# Patient Record
Sex: MALE | Race: WHITE | NOT HISPANIC OR LATINO
[De-identification: names, ages, dates, MRNs, and addresses within clinical notes are randomized per-mention and may not be internally consistent; named-entity substitution may affect disease eponyms.]

---

## 2021-11-29 ENCOUNTER — NON-APPOINTMENT (OUTPATIENT)
Age: 71
End: 2021-11-29

## 2021-11-29 ENCOUNTER — APPOINTMENT (OUTPATIENT)
Dept: OPHTHALMOLOGY | Facility: CLINIC | Age: 71
End: 2021-11-29
Payer: MEDICARE

## 2021-11-29 PROCEDURE — 92134 CPTRZ OPH DX IMG PST SGM RTA: CPT

## 2021-11-29 PROCEDURE — 99204 OFFICE O/P NEW MOD 45 MIN: CPT

## 2021-11-30 ENCOUNTER — TRANSCRIPTION ENCOUNTER (OUTPATIENT)
Age: 71
End: 2021-11-30

## 2021-11-30 ENCOUNTER — OUTPATIENT (OUTPATIENT)
Dept: OUTPATIENT SERVICES | Facility: HOSPITAL | Age: 71
LOS: 1 days | End: 2021-11-30
Payer: MEDICARE

## 2021-11-30 DIAGNOSIS — Z20.828 CONTACT WITH AND (SUSPECTED) EXPOSURE TO OTHER VIRAL COMMUNICABLE DISEASES: ICD-10-CM

## 2021-11-30 LAB — SARS-COV-2 RNA SPEC QL NAA+PROBE: SIGNIFICANT CHANGE UP

## 2021-11-30 PROCEDURE — 87635 SARS-COV-2 COVID-19 AMP PRB: CPT

## 2021-12-01 ENCOUNTER — OUTPATIENT (OUTPATIENT)
Dept: OUTPATIENT SERVICES | Facility: HOSPITAL | Age: 71
LOS: 1 days | End: 2021-12-01
Payer: MEDICARE

## 2021-12-01 VITALS
RESPIRATION RATE: 16 BRPM | SYSTOLIC BLOOD PRESSURE: 150 MMHG | DIASTOLIC BLOOD PRESSURE: 79 MMHG | HEART RATE: 79 BPM | OXYGEN SATURATION: 100 %

## 2021-12-01 VITALS
WEIGHT: 159.39 LBS | TEMPERATURE: 98 F | RESPIRATION RATE: 18 BRPM | SYSTOLIC BLOOD PRESSURE: 132 MMHG | HEIGHT: 69 IN | DIASTOLIC BLOOD PRESSURE: 71 MMHG | OXYGEN SATURATION: 99 % | HEART RATE: 83 BPM

## 2021-12-01 DIAGNOSIS — H33.022 RETINAL DETACHMENT WITH MULTIPLE BREAKS, LEFT EYE: ICD-10-CM

## 2021-12-01 DIAGNOSIS — Z98.49 CATARACT EXTRACTION STATUS, UNSPECIFIED EYE: Chronic | ICD-10-CM

## 2021-12-01 PROCEDURE — C1889: CPT

## 2021-12-01 PROCEDURE — 67108 REPAIR DETACHED RETINA: CPT | Mod: LT

## 2021-12-01 PROCEDURE — 67108 REPAIR DETACHED RETINA: CPT | Mod: AS

## 2021-12-01 NOTE — ASU DISCHARGE PLAN (ADULT/PEDIATRIC) - PATIENT EDUCATION MATERIALS PROVIED
Suresh gas bubble card, Eye shield instructions and eye kit given to patient/Implant card (specify)/Other (specify)

## 2021-12-01 NOTE — ASU DISCHARGE PLAN (ADULT/PEDIATRIC) - NS MD DC FALL RISK RISK
For information on Fall & Injury Prevention, visit: [email or web address] OR  [email or web address] OR  https://www.cdc.gov/steadi/patient.html

## 2021-12-01 NOTE — ASU PATIENT PROFILE, ADULT - VISION (WITH CORRECTIVE LENSES IF THE PATIENT USUALLY WEARS THEM):
Left eye poor/Partially impaired: cannot see medication labels or newsprint, but can see obstacles in path, and the surrounding layout; can count fingers at arm's length

## 2021-12-01 NOTE — ASU PATIENT PROFILE, ADULT - HEALTHCARE QUESTIONS, PROFILE
Immunization(s) given during visit:    Immunizations Administered     Name Date Dose Route    Influenza, Quadv, adjuvanted, 65 yrs +, IM, PF (Fluad) 9/9/2021 0.5 mL Intramuscular    Site: Deltoid- Right    Lot: [id number]    NDC: 74439-364-96          Most recent Vaccine Information Sheet dated 08/06/21 given to pt will speak with surgeon & anesthesia prior to surgery

## 2021-12-01 NOTE — ASU PATIENT PROFILE, ADULT - FALL HARM RISK - UNIVERSAL INTERVENTIONS
Bed in lowest position, wheels locked, appropriate side rails in place/Call bell, personal items and telephone in reach/Instruct patient to call for assistance before getting out of bed or chair/Non-slip footwear when patient is out of bed/[place] to call system/Physically safe environment - no spills, clutter or unnecessary equipment/Purposeful Proactive Rounding/Room/bathroom lighting operational, light cord in reach

## 2021-12-01 NOTE — ASU DISCHARGE PLAN (ADULT/PEDIATRIC) - CARE PROVIDER_API CALL
Paul Alarcon)  Ophthalmology  [address] [place] [address], [address]  [place] [address]  Phone: [phone]  Fax: [phone]  Scheduled Appointment: 12/02/2021 09:15 AM

## 2022-02-16 ENCOUNTER — NON-APPOINTMENT (OUTPATIENT)
Age: 72
End: 2022-02-16

## 2022-02-16 ENCOUNTER — APPOINTMENT (OUTPATIENT)
Dept: OPHTHALMOLOGY | Facility: CLINIC | Age: 72
End: 2022-02-16
Payer: MEDICARE

## 2022-02-16 PROBLEM — E78.5 HYPERLIPIDEMIA, UNSPECIFIED: Chronic | Status: ACTIVE | Noted: 2021-12-01

## 2022-02-16 PROCEDURE — 92014 COMPRE OPH EXAM EST PT 1/>: CPT

## 2022-03-04 PROBLEM — Z00.00 ENCOUNTER FOR PREVENTIVE HEALTH EXAMINATION: Status: ACTIVE | Noted: 2022-03-04

## 2022-04-18 ENCOUNTER — NON-APPOINTMENT (OUTPATIENT)
Age: 72
End: 2022-04-18

## 2022-04-18 ENCOUNTER — APPOINTMENT (OUTPATIENT)
Dept: OPHTHALMOLOGY | Facility: CLINIC | Age: 72
End: 2022-04-18
Payer: MEDICARE

## 2022-04-18 PROCEDURE — 92014 COMPRE OPH EXAM EST PT 1/>: CPT

## 2022-04-18 PROCEDURE — 92015 DETERMINE REFRACTIVE STATE: CPT

## 2022-07-19 ENCOUNTER — APPOINTMENT (OUTPATIENT)
Dept: OPHTHALMOLOGY | Facility: CLINIC | Age: 72
End: 2022-07-19

## 2022-07-19 ENCOUNTER — NON-APPOINTMENT (OUTPATIENT)
Age: 72
End: 2022-07-19

## 2022-07-19 PROCEDURE — 92014 COMPRE OPH EXAM EST PT 1/>: CPT

## 2023-01-18 ENCOUNTER — APPOINTMENT (OUTPATIENT)
Dept: OPHTHALMOLOGY | Facility: CLINIC | Age: 73
End: 2023-01-18

## 2023-06-20 NOTE — ASU PATIENT PROFILE, ADULT - FALL HARM RISK - FALL HARM RISK
Airway patent, Nasal mucosa clear. Mouth with normal mucosa. Throat has no vesicles, no oropharyngeal exudates and uvula is midline.  Scalp is atraumatic nontender nondeformed.  Neck is supple full range of motion intact nontender. No indicators present

## 2023-10-25 ENCOUNTER — OFFICE (OUTPATIENT)
Dept: URBAN - METROPOLITAN AREA CLINIC 112 | Facility: CLINIC | Age: 73
Setting detail: OPHTHALMOLOGY
End: 2023-10-25
Payer: MEDICARE

## 2023-10-25 DIAGNOSIS — Z96.1: ICD-10-CM

## 2023-10-25 DIAGNOSIS — H25.11: ICD-10-CM

## 2023-10-25 DIAGNOSIS — H33.8: ICD-10-CM

## 2023-10-25 DIAGNOSIS — H40.013: ICD-10-CM

## 2023-10-25 PROBLEM — H16.223 DRY EYE SYNDROME K SICCA; BOTH EYES: Status: ACTIVE | Noted: 2023-10-25

## 2023-10-25 PROCEDURE — 92083 EXTENDED VISUAL FIELD XM: CPT | Performed by: OPHTHALMOLOGY

## 2023-10-25 PROCEDURE — 92004 COMPRE OPH EXAM NEW PT 1/>: CPT | Performed by: OPHTHALMOLOGY

## 2023-10-25 PROCEDURE — 92250 FUNDUS PHOTOGRAPHY W/I&R: CPT | Performed by: OPHTHALMOLOGY

## 2023-10-25 ASSESSMENT — SUPERFICIAL PUNCTATE KERATITIS (SPK)
OD_SPK: 1+
OS_SPK: 1+

## 2023-10-25 ASSESSMENT — REFRACTION_AUTOREFRACTION
OS_SPHERE: -0.75
OS_CYLINDER: -2.00
OD_CYLINDER: -2.75
OS_AXIS: 082
OD_SPHERE: -1.50
OD_AXIS: 098

## 2023-10-25 ASSESSMENT — VISUAL ACUITY
OS_BCVA: 20/25
OD_BCVA: 20/20-

## 2023-10-25 ASSESSMENT — TONOMETRY: OD_IOP_MMHG: 21

## 2023-10-25 ASSESSMENT — CONFRONTATIONAL VISUAL FIELD TEST (CVF)
OD_FINDINGS: FULL
OS_FINDINGS: FULL

## 2023-10-25 ASSESSMENT — SPHEQUIV_DERIVED
OS_SPHEQUIV: -1.75
OD_SPHEQUIV: -2.875

## 2023-12-07 ENCOUNTER — OFFICE (OUTPATIENT)
Dept: URBAN - METROPOLITAN AREA CLINIC 112 | Facility: CLINIC | Age: 73
Setting detail: OPHTHALMOLOGY
End: 2023-12-07
Payer: MEDICARE

## 2023-12-07 DIAGNOSIS — H33.8: ICD-10-CM

## 2023-12-07 DIAGNOSIS — Z96.1: ICD-10-CM

## 2023-12-07 DIAGNOSIS — H40.013: ICD-10-CM

## 2023-12-07 DIAGNOSIS — H25.11: ICD-10-CM

## 2023-12-07 PROCEDURE — 92136 OPHTHALMIC BIOMETRY: CPT | Mod: TC | Performed by: OPHTHALMOLOGY

## 2023-12-07 PROCEDURE — 92136 OPHTHALMIC BIOMETRY: CPT | Mod: 26,RT | Performed by: OPHTHALMOLOGY

## 2023-12-07 PROCEDURE — 99214 OFFICE O/P EST MOD 30 MIN: CPT | Performed by: OPHTHALMOLOGY

## 2023-12-07 ASSESSMENT — REFRACTION_CURRENTRX
OD_CYLINDER: -1.50
OD_OVR_VA: 20/
OD_SPHERE: -0.50
OS_SPHERE: -0.25
OS_OVR_VA: 20/
OS_CYLINDER: -1.00
OD_AXIS: 121
OS_AXIS: 084

## 2023-12-07 ASSESSMENT — REFRACTION_AUTOREFRACTION
OS_SPHERE: -1.00
OD_CYLINDER: -2.50
OS_CYLINDER: -1.75
OS_AXIS: 080
OD_AXIS: 098
OD_SPHERE: -1.50

## 2023-12-07 ASSESSMENT — REFRACTION_MANIFEST
OD_VA1: 20/20
OD_SPHERE: -1.50
OS_VA1: 20/25
OS_SPHERE: -1.00
OD_AXIS: 098
OD_CYLINDER: -2.50
OS_CYLINDER: -1.75
OS_AXIS: 084

## 2023-12-07 ASSESSMENT — SUPERFICIAL PUNCTATE KERATITIS (SPK)
OS_SPK: 1+
OD_SPK: 1+

## 2023-12-07 ASSESSMENT — CONFRONTATIONAL VISUAL FIELD TEST (CVF)
OS_FINDINGS: FULL
OD_FINDINGS: FULL

## 2023-12-07 ASSESSMENT — SPHEQUIV_DERIVED
OD_SPHEQUIV: -2.75
OS_SPHEQUIV: -1.875
OS_SPHEQUIV: -1.875
OD_SPHEQUIV: -2.75

## 2024-01-30 ENCOUNTER — OFFICE (OUTPATIENT)
Dept: URBAN - METROPOLITAN AREA CLINIC 94 | Facility: CLINIC | Age: 74
Setting detail: OPHTHALMOLOGY
End: 2024-01-30
Payer: MEDICARE

## 2024-01-30 DIAGNOSIS — H33.8: ICD-10-CM

## 2024-01-30 DIAGNOSIS — H35.413: ICD-10-CM

## 2024-01-30 DIAGNOSIS — H35.3121: ICD-10-CM

## 2024-01-30 PROCEDURE — 92134 CPTRZ OPH DX IMG PST SGM RTA: CPT | Performed by: OPHTHALMOLOGY

## 2024-01-30 PROCEDURE — 92012 INTRM OPH EXAM EST PATIENT: CPT | Performed by: OPHTHALMOLOGY

## 2024-01-30 ASSESSMENT — CONFRONTATIONAL VISUAL FIELD TEST (CVF)
OS_FINDINGS: FULL
OD_FINDINGS: FULL

## 2024-01-30 ASSESSMENT — REFRACTION_AUTOREFRACTION
OS_AXIS: 080
OS_CYLINDER: -1.75
OD_SPHERE: -1.50
OD_AXIS: 098
OD_CYLINDER: -2.50
OS_SPHERE: -1.00

## 2024-01-30 ASSESSMENT — SPHEQUIV_DERIVED
OD_SPHEQUIV: -2.75
OS_SPHEQUIV: -1.875

## 2024-01-30 ASSESSMENT — SUPERFICIAL PUNCTATE KERATITIS (SPK)
OS_SPK: 1+
OD_SPK: 1+

## 2024-02-01 ENCOUNTER — RX ONLY (RX ONLY)
Age: 74
End: 2024-02-01

## 2024-02-01 ENCOUNTER — OFFICE (OUTPATIENT)
Dept: URBAN - METROPOLITAN AREA CLINIC 112 | Facility: CLINIC | Age: 74
Setting detail: OPHTHALMOLOGY
End: 2024-02-01
Payer: MEDICARE

## 2024-02-01 DIAGNOSIS — Z96.1: ICD-10-CM

## 2024-02-01 PROBLEM — H35.413 LATTICE DEGENERATION; BOTH EYES: Status: ACTIVE | Noted: 2024-01-30

## 2024-02-01 PROBLEM — H35.3121 AGE RELATED MACULAR DEGENERATION DRY; LEFT EYE EARLY: Status: ACTIVE | Noted: 2024-01-30

## 2024-02-01 PROCEDURE — 99024 POSTOP FOLLOW-UP VISIT: CPT | Performed by: OPHTHALMOLOGY

## 2024-02-01 ASSESSMENT — SUPERFICIAL PUNCTATE KERATITIS (SPK)
OD_SPK: 1+
OS_SPK: 1+

## 2024-02-01 ASSESSMENT — SPHEQUIV_DERIVED
OS_SPHEQUIV: -1.875
OD_SPHEQUIV: -0.375

## 2024-02-01 ASSESSMENT — REFRACTION_AUTOREFRACTION
OS_SPHERE: -1.00
OD_AXIS: 050
OD_CYLINDER: -0.25
OS_CYLINDER: -1.75
OD_SPHERE: -0.25
OS_AXIS: 080

## 2024-02-08 ENCOUNTER — OFFICE (OUTPATIENT)
Dept: URBAN - METROPOLITAN AREA CLINIC 94 | Facility: CLINIC | Age: 74
Setting detail: OPHTHALMOLOGY
End: 2024-02-08
Payer: MEDICARE

## 2024-02-08 DIAGNOSIS — Z96.1: ICD-10-CM

## 2024-02-08 PROCEDURE — 99024 POSTOP FOLLOW-UP VISIT: CPT | Performed by: OPHTHALMOLOGY

## 2024-02-08 ASSESSMENT — SPHEQUIV_DERIVED
OD_SPHEQUIV: 0
OS_SPHEQUIV: -1.75

## 2024-02-08 ASSESSMENT — CONFRONTATIONAL VISUAL FIELD TEST (CVF)
OS_FINDINGS: FULL
OD_FINDINGS: FULL

## 2024-02-08 ASSESSMENT — REFRACTION_AUTOREFRACTION
OS_AXIS: 082
OD_CYLINDER: -0.50
OS_SPHERE: -1.00
OD_SPHERE: +0.25
OS_CYLINDER: -1.50
OD_AXIS: 102

## 2024-02-08 ASSESSMENT — SUPERFICIAL PUNCTATE KERATITIS (SPK)
OS_SPK: 1+
OD_SPK: 1+

## 2024-03-07 ENCOUNTER — OFFICE (OUTPATIENT)
Dept: URBAN - METROPOLITAN AREA CLINIC 112 | Facility: CLINIC | Age: 74
Setting detail: OPHTHALMOLOGY
End: 2024-03-07
Payer: MEDICARE

## 2024-03-07 DIAGNOSIS — Z96.1: ICD-10-CM

## 2024-03-07 PROCEDURE — 99024 POSTOP FOLLOW-UP VISIT: CPT | Performed by: OPHTHALMOLOGY

## 2024-03-19 ENCOUNTER — OFFICE (OUTPATIENT)
Dept: URBAN - METROPOLITAN AREA CLINIC 94 | Facility: CLINIC | Age: 74
Setting detail: OPHTHALMOLOGY
End: 2024-03-19
Payer: MEDICARE

## 2024-03-19 DIAGNOSIS — H33.8: ICD-10-CM

## 2024-03-19 DIAGNOSIS — H35.3121: ICD-10-CM

## 2024-03-19 PROCEDURE — 92134 CPTRZ OPH DX IMG PST SGM RTA: CPT | Performed by: OPHTHALMOLOGY

## 2024-03-19 PROCEDURE — 92012 INTRM OPH EXAM EST PATIENT: CPT | Mod: 24 | Performed by: OPHTHALMOLOGY

## 2024-06-06 ENCOUNTER — OFFICE (OUTPATIENT)
Dept: URBAN - METROPOLITAN AREA CLINIC 112 | Facility: CLINIC | Age: 74
Setting detail: OPHTHALMOLOGY
End: 2024-06-06
Payer: MEDICARE

## 2024-06-06 DIAGNOSIS — Z96.1: ICD-10-CM

## 2024-06-06 DIAGNOSIS — H16.223: ICD-10-CM

## 2024-06-06 DIAGNOSIS — H40.013: ICD-10-CM

## 2024-06-06 PROCEDURE — 92083 EXTENDED VISUAL FIELD XM: CPT | Performed by: OPHTHALMOLOGY

## 2024-06-06 PROCEDURE — 92250 FUNDUS PHOTOGRAPHY W/I&R: CPT | Performed by: OPHTHALMOLOGY

## 2024-06-06 PROCEDURE — 99213 OFFICE O/P EST LOW 20 MIN: CPT | Performed by: OPHTHALMOLOGY

## 2024-06-06 ASSESSMENT — CONFRONTATIONAL VISUAL FIELD TEST (CVF)
OS_FINDINGS: FULL
OD_FINDINGS: FULL

## 2024-08-13 ENCOUNTER — OFFICE (OUTPATIENT)
Dept: URBAN - METROPOLITAN AREA CLINIC 94 | Facility: CLINIC | Age: 74
Setting detail: OPHTHALMOLOGY
End: 2024-08-13
Payer: MEDICARE

## 2024-08-13 DIAGNOSIS — H35.3121: ICD-10-CM

## 2024-08-13 PROCEDURE — 92235 FLUORESCEIN ANGRPH MLTIFRAME: CPT | Performed by: OPHTHALMOLOGY

## 2024-08-13 PROCEDURE — 92012 INTRM OPH EXAM EST PATIENT: CPT | Performed by: OPHTHALMOLOGY

## 2024-08-13 PROCEDURE — 92134 CPTRZ OPH DX IMG PST SGM RTA: CPT | Performed by: OPHTHALMOLOGY

## 2024-08-13 ASSESSMENT — CONFRONTATIONAL VISUAL FIELD TEST (CVF)
OD_FINDINGS: FULL
OS_FINDINGS: FULL

## 2024-12-19 ENCOUNTER — OFFICE (OUTPATIENT)
Dept: URBAN - METROPOLITAN AREA CLINIC 112 | Facility: CLINIC | Age: 74
Setting detail: OPHTHALMOLOGY
End: 2024-12-19
Payer: MEDICARE

## 2024-12-19 DIAGNOSIS — H16.223: ICD-10-CM

## 2024-12-19 DIAGNOSIS — H40.013: ICD-10-CM

## 2024-12-19 DIAGNOSIS — H16.222: ICD-10-CM

## 2024-12-19 DIAGNOSIS — H52.4: ICD-10-CM

## 2024-12-19 DIAGNOSIS — Z96.1: ICD-10-CM

## 2024-12-19 PROBLEM — H16.221 DRY EYE SYNDROME K SICCA; RIGHT EYE, LEFT EYE, BOTH EYES: Status: ACTIVE | Noted: 2024-12-19

## 2024-12-19 PROCEDURE — 92015 DETERMINE REFRACTIVE STATE: CPT | Performed by: OPHTHALMOLOGY

## 2024-12-19 PROCEDURE — 99213 OFFICE O/P EST LOW 20 MIN: CPT | Performed by: OPHTHALMOLOGY

## 2024-12-19 PROCEDURE — 83861 MICROFLUID ANALY TEARS: CPT | Mod: QW,LT | Performed by: OPHTHALMOLOGY

## 2024-12-19 PROCEDURE — 83861 MICROFLUID ANALY TEARS: CPT | Performed by: OPHTHALMOLOGY

## 2024-12-19 PROCEDURE — 92133 CPTRZD OPH DX IMG PST SGM ON: CPT | Performed by: OPHTHALMOLOGY

## 2024-12-19 ASSESSMENT — REFRACTION_MANIFEST
OS_ADD: +3.00
OU_VA: 20/20
OS_VA1: 20/20-1
OD_VA1: 20/25
OD_CYLINDER: -0.50
OD_SPHERE: +1.00
OS_CYLINDER: -1.25
OD_ADD: +3.00
OS_SPHERE: -1.00
OD_AXIS: 100
OS_AXIS: 079

## 2024-12-19 ASSESSMENT — SUPERFICIAL PUNCTATE KERATITIS (SPK)
OS_SPK: 1+
OD_SPK: 1+

## 2024-12-19 ASSESSMENT — KERATOMETRY
OD_AXISANGLE_DEGREES: 137
OD_K1POWER_DIOPTERS: 46.25
OS_K2POWER_DIOPTERS: 46.50
OD_K2POWER_DIOPTERS: 46.75
OS_AXISANGLE_DEGREES: 147
OS_K1POWER_DIOPTERS: 46.00

## 2024-12-19 ASSESSMENT — CONFRONTATIONAL VISUAL FIELD TEST (CVF)
OS_FINDINGS: FULL
OD_FINDINGS: FULL

## 2024-12-19 ASSESSMENT — REFRACTION_AUTOREFRACTION
OS_CYLINDER: -1.50
OD_AXIS: 100
OD_CYLINDER: -0.75
OD_SPHERE: +0.50
OS_SPHERE: -1.25
OS_AXIS: 079

## 2024-12-19 ASSESSMENT — VISUAL ACUITY
OS_BCVA: 20/25+1
OD_BCVA: 20/30

## 2024-12-19 ASSESSMENT — TONOMETRY
OD_IOP_MMHG: 17
OS_IOP_MMHG: 14

## 2025-06-19 ENCOUNTER — OFFICE (OUTPATIENT)
Dept: URBAN - METROPOLITAN AREA CLINIC 112 | Facility: CLINIC | Age: 75
Setting detail: OPHTHALMOLOGY
End: 2025-06-19
Payer: MEDICARE

## 2025-06-19 DIAGNOSIS — H16.221: ICD-10-CM

## 2025-06-19 DIAGNOSIS — H16.222: ICD-10-CM

## 2025-06-19 DIAGNOSIS — H16.223: ICD-10-CM

## 2025-06-19 DIAGNOSIS — H40.013: ICD-10-CM

## 2025-06-19 DIAGNOSIS — Z96.1: ICD-10-CM

## 2025-06-19 PROCEDURE — 92014 COMPRE OPH EXAM EST PT 1/>: CPT | Performed by: OPHTHALMOLOGY

## 2025-06-19 PROCEDURE — 92083 EXTENDED VISUAL FIELD XM: CPT | Performed by: OPHTHALMOLOGY

## 2025-06-19 PROCEDURE — 92250 FUNDUS PHOTOGRAPHY W/I&R: CPT | Performed by: OPHTHALMOLOGY

## 2025-06-19 ASSESSMENT — REFRACTION_AUTOREFRACTION
OS_CYLINDER: -1.75
OS_SPHERE: -1.00
OD_AXIS: 105
OD_CYLINDER: -0.50
OS_AXIS: 079
OD_SPHERE: +0.75

## 2025-06-19 ASSESSMENT — KERATOMETRY
OS_K1POWER_DIOPTERS: 46.00
OD_K2POWER_DIOPTERS: 46.50
OD_K1POWER_DIOPTERS: 46.00
OD_AXISANGLE_DEGREES: 117
OS_K2POWER_DIOPTERS: 46.75
OS_AXISANGLE_DEGREES: 160

## 2025-06-19 ASSESSMENT — REFRACTION_MANIFEST
OD_CYLINDER: -0.50
OS_SPHERE: -1.00
OS_CYLINDER: -1.25
OD_ADD: +3.00
OS_VA1: 20/20-1
OS_ADD: +3.00
OD_SPHERE: +1.00
OD_VA1: 20/25
OD_AXIS: 100
OS_AXIS: 079
OU_VA: 20/20

## 2025-06-19 ASSESSMENT — CONFRONTATIONAL VISUAL FIELD TEST (CVF)
OS_FINDINGS: FULL
OD_FINDINGS: FULL

## 2025-06-19 ASSESSMENT — VISUAL ACUITY
OS_BCVA: 20/25-1
OD_BCVA: 20/25

## 2025-06-19 ASSESSMENT — SUPERFICIAL PUNCTATE KERATITIS (SPK)
OS_SPK: 1+
OD_SPK: 1+

## 2025-06-19 ASSESSMENT — TONOMETRY
OS_IOP_MMHG: 18
OD_IOP_MMHG: 12
OD_IOP_MMHG: 12